# Patient Record
Sex: FEMALE | Race: BLACK OR AFRICAN AMERICAN | NOT HISPANIC OR LATINO
[De-identification: names, ages, dates, MRNs, and addresses within clinical notes are randomized per-mention and may not be internally consistent; named-entity substitution may affect disease eponyms.]

---

## 2018-03-26 ENCOUNTER — RESULT REVIEW (OUTPATIENT)
Age: 48
End: 2018-03-26

## 2020-09-29 ENCOUNTER — APPOINTMENT (OUTPATIENT)
Dept: NEUROLOGY | Facility: CLINIC | Age: 50
End: 2020-09-29
Payer: COMMERCIAL

## 2020-09-29 VITALS
BODY MASS INDEX: 38.37 KG/M2 | HEIGHT: 70 IN | WEIGHT: 268 LBS | SYSTOLIC BLOOD PRESSURE: 131 MMHG | DIASTOLIC BLOOD PRESSURE: 84 MMHG | HEART RATE: 83 BPM

## 2020-09-29 VITALS — TEMPERATURE: 97.1 F

## 2020-09-29 DIAGNOSIS — Z87.891 PERSONAL HISTORY OF NICOTINE DEPENDENCE: ICD-10-CM

## 2020-09-29 DIAGNOSIS — H50.9 UNSPECIFIED STRABISMUS: ICD-10-CM

## 2020-09-29 PROCEDURE — 99205 OFFICE O/P NEW HI 60 MIN: CPT

## 2020-10-05 ENCOUNTER — APPOINTMENT (OUTPATIENT)
Dept: NEUROLOGY | Facility: CLINIC | Age: 50
End: 2020-10-05

## 2020-10-05 RX ORDER — CLOTRIMAZOLE AND BETAMETHASONE DIPROPIONATE 10; .5 MG/G; MG/G
1-0.05 CREAM TOPICAL
Refills: 0 | Status: ACTIVE | COMMUNITY
Start: 2020-10-05

## 2020-10-05 RX ORDER — DULOXETINE HYDROCHLORIDE 30 MG/1
30 CAPSULE, DELAYED RELEASE PELLETS ORAL
Qty: 30 | Refills: 1 | Status: ACTIVE | COMMUNITY
Start: 2020-10-05

## 2020-10-05 RX ORDER — SIMVASTATIN 40 MG/1
40 TABLET, FILM COATED ORAL
Qty: 90 | Refills: 2 | Status: ACTIVE | COMMUNITY
Start: 2020-10-05

## 2020-10-05 RX ORDER — CYCLOBENZAPRINE HYDROCHLORIDE 10 MG/1
10 TABLET, FILM COATED ORAL TWICE DAILY
Refills: 0 | Status: ACTIVE | COMMUNITY
Start: 2020-10-05

## 2020-10-05 RX ORDER — DULOXETINE HYDROCHLORIDE 60 MG/1
60 CAPSULE, DELAYED RELEASE ORAL
Refills: 0 | Status: DISCONTINUED | COMMUNITY
End: 2020-10-05

## 2020-10-05 RX ORDER — SIMVASTATIN 80 MG/1
TABLET, FILM COATED ORAL
Refills: 0 | Status: DISCONTINUED | COMMUNITY
End: 2020-10-05

## 2020-10-05 RX ORDER — OMEGA-3/DHA/EPA/FISH OIL 60 MG-90MG
500 CAPSULE ORAL
Refills: 0 | Status: ACTIVE | COMMUNITY
Start: 2020-10-05

## 2020-10-05 RX ORDER — BENZALKONIUM CHLORIDE 170 MG/89ML
GEL TOPICAL
Refills: 0 | Status: ACTIVE | COMMUNITY
Start: 2020-10-05

## 2020-10-05 RX ORDER — ERGOCALCIFEROL 1.25 MG/1
1.25 MG CAPSULE, LIQUID FILLED ORAL
Refills: 0 | Status: ACTIVE | COMMUNITY
Start: 2020-10-05

## 2020-10-20 ENCOUNTER — APPOINTMENT (OUTPATIENT)
Dept: MRI IMAGING | Facility: HOSPITAL | Age: 50
End: 2020-10-20

## 2020-10-20 ENCOUNTER — OUTPATIENT (OUTPATIENT)
Dept: OUTPATIENT SERVICES | Facility: HOSPITAL | Age: 50
LOS: 1 days | End: 2020-10-20
Payer: COMMERCIAL

## 2020-10-20 ENCOUNTER — TRANSCRIPTION ENCOUNTER (OUTPATIENT)
Age: 50
End: 2020-10-20

## 2020-10-20 DIAGNOSIS — Z00.00 ENCOUNTER FOR GENERAL ADULT MEDICAL EXAMINATION WITHOUT ABNORMAL FINDINGS: ICD-10-CM

## 2020-10-20 DIAGNOSIS — I66.01 OCCLUSION AND STENOSIS OF RIGHT MIDDLE CEREBRAL ARTERY: ICD-10-CM

## 2020-10-20 PROCEDURE — 70549 MR ANGIOGRAPH NECK W/O&W/DYE: CPT

## 2020-10-20 PROCEDURE — 70551 MRI BRAIN STEM W/O DYE: CPT | Mod: 26

## 2020-10-20 PROCEDURE — 70544 MR ANGIOGRAPHY HEAD W/O DYE: CPT | Mod: 26,59

## 2020-10-20 PROCEDURE — 70551 MRI BRAIN STEM W/O DYE: CPT

## 2020-10-20 PROCEDURE — 70544 MR ANGIOGRAPHY HEAD W/O DYE: CPT

## 2020-10-20 PROCEDURE — A9585: CPT

## 2020-10-20 PROCEDURE — 70549 MR ANGIOGRAPH NECK W/O&W/DYE: CPT | Mod: 26

## 2020-10-26 ENCOUNTER — TRANSCRIPTION ENCOUNTER (OUTPATIENT)
Age: 50
End: 2020-10-26

## 2020-12-18 ENCOUNTER — APPOINTMENT (OUTPATIENT)
Dept: NEUROLOGY | Facility: CLINIC | Age: 50
End: 2020-12-18
Payer: COMMERCIAL

## 2020-12-18 VITALS
WEIGHT: 267 LBS | DIASTOLIC BLOOD PRESSURE: 98 MMHG | OXYGEN SATURATION: 98 % | BODY MASS INDEX: 38.22 KG/M2 | HEIGHT: 70 IN | SYSTOLIC BLOOD PRESSURE: 141 MMHG | HEART RATE: 88 BPM

## 2020-12-18 DIAGNOSIS — Z98.890 OTHER SPECIFIED POSTPROCEDURAL STATES: ICD-10-CM

## 2020-12-18 PROCEDURE — 99072 ADDL SUPL MATRL&STAF TM PHE: CPT

## 2020-12-18 PROCEDURE — 99215 OFFICE O/P EST HI 40 MIN: CPT

## 2020-12-18 NOTE — PHYSICAL EXAM
[General Appearance - Alert] : alert [General Appearance - In No Acute Distress] : in no acute distress [Oriented To Time, Place, And Person] : oriented to person, place, and time [Impaired Insight] : insight and judgment were intact [Affect] : the affect was normal [Person] : oriented to person [Place] : oriented to place [Time] : oriented to time [Concentration Intact] : normal concentrating ability [Visual Intact] : visual attention was ~T not ~L decreased [Naming Objects] : no difficulty naming common objects [Repeating Phrases] : no difficulty repeating a phrase [Reading] : reading intact [Cranial Nerves Trigeminal (V)] : facial sensation intact symmetrically [Cranial Nerves Facial (VII)] : face symmetrical [Cranial Nerves Vestibulocochlear (VIII)] : hearing was intact bilaterally [Cranial Nerves Glossopharyngeal (IX)] : tongue and palate midline [Cranial Nerves Accessory (XI - Cranial And Spinal)] : head turning and shoulder shrug symmetric [Cranial Nerves Hypoglossal (XII)] : there was no tongue deviation with protrusion [Motor Tone] : muscle tone was normal in all four extremities [Motor Strength] : muscle strength was normal in all four extremities [No Muscle Atrophy] : normal bulk in all four extremities [Motor Handedness Right-Handed] : the patient is right hand dominant [Sensation Tactile Decrease] : light touch was intact [Balance] : balance was intact [Sclera] : the sclera and conjunctiva were normal [Outer Ear] : the ears and nose were normal in appearance [] : no respiratory distress [Heart Rate And Rhythm] : heart rate was normal and rhythm regular [Edema] : there was no peripheral edema [Bowel Sounds] : normal bowel sounds [Abnormal Walk] : normal gait [Skin Color & Pigmentation] : normal skin color and pigmentation [Paresis Pronator Drift Right-Sided] : no pronator drift on the right [Paresis Pronator Drift Left-Sided] : no pronator drift on the left [Motor Strength Upper Extremities Bilaterally] : strength was normal in both upper extremities [Motor Strength Lower Extremities Bilaterally] : strength was normal in both lower extremities [Romberg's Sign] : Romberg's sign was negtive [Past-pointing] : there was no past-pointing [Tremor] : no tremor present [Dysdiadochokinesia Bilaterally] : not present [Coordination - Dysmetria Impaired Finger-to-Nose Bilateral] : not present [Coordination - Dysmetria Impaired Heel-to-Shin Bilateral] : not present [Plantar Reflex Right Only] : normal on the right [Plantar Reflex Left Only] : normal on the left [FreeTextEntry5] : L Strabismus, R keratoconus, ? right sided horizontal nystagmus on left lateral gaze  [FreeTextEntry6] : limited movement in L shoulder due to surgery

## 2020-12-18 NOTE — DISCUSSION/SUMMARY
[Antithrombotic therapy with ___] : antithrombotic therapy with  [unfilled] [Lipid Lowering Therapy] : lipid lowering therapy [Patient encouraged to discuss with Primary MD] : I encouraged the patient to discuss these important issues with ~his/her~ primary care doctor [Goals and Counseling] : I have reviewed the goals of stroke risk factor modification. I counseled the patient on measures to reduce stroke risk, including the importance of medication compliance, risk factor control, exercise, healthy diet and avoidance of smoking. I reviewed stroke warning signs and symptoms and appropriate actions to take if such occur. [FreeTextEntry1] : Impression: Asymptomatic R MCA stenosis. Likely not vasculitis or RVCS, \par \par Exam pertient for L Strabismus, right sided horizontal nystagmus on left lateral gaze, otherwise non-focal\par \par Imaging reviewed\par \par Plan:\par -c/w to monitor, asymptomatic at this time, MRI negative for any previous infarcts \par -c/w ASA 81mg daily \par -Most recent LDL is 285, patient is on statin at home, advised for lifestyle changes \par -A1c: 5.9, advised for lifestyle changes. \par -Discussed signs of stroke, TIA with patient and advised to call 911 if experiencing \par -Recommending imaging prior to Strabismus to assess for degree to stenosis. Dr. Gaines (cell # [phone]) advised and agrees with plan.\par -recommend TCDs, carotid dopplers  \par -follow up in 6 months for follow up

## 2020-12-18 NOTE — HISTORY OF PRESENT ILLNESS
[FreeTextEntry1] : Ms. Gloria is a 49 yo woman who presents to vascular neurology clinic for follow up regarding MRA finding of asymptomatic right proximal M1 stenosis, incidentally found during workup performed by Dr. Gaines, one of her outpatient physicians, regarding chronic left strabismus, as well as chronic headaches. MRI non-contrast unremarkable for any infarction, MRA NOVA does reveal markedly decreased flow through the M1 segment on the right, but with good flow both proximally and distally to the filling defect. The rest of vessels open. Patient remains asymptomatic with no focal deficits localized to the right MCA territory. Patient states she is a little overwhelmed with many social issues she's been having recently. She was sincerely apologetic for arriving late to her appointment. \par _________________________\par \par Ms. Gloria 50 year old PMHx HLD, anemic s/p blood and iron transfusion, L Strabismus surgery (2000)  who presents today for consultation after abnormal MRA \par \par As a infant she was wore a patch on the L eye with minimal improvement. In 2000 she had L Strabismus surgery but she feels over the years it has gotten worse - the eye is now upwards and outwards. In April is when her vision declined, and she developed double vision and her left eye started to tear. She was found to have a R keratoconus and sent to Dr. Gaines to discuss Strabismus surgery. In August she had L sided rotator cuff surgery and now have intermittent numbness in her fingers on the left side. In general she has infrequent headaches (approx 1 per month) on the right side of her head lasting less then 5 minutes. She state the pain is intense and "stops in her tracks". During this time she had BL hand numbness but denies any new or worsening neurological signs or symptoms. She denies any hx of DVT, PE and had 1 miscarriage <10 weeks\par \par Dr. Gaines ordered brain imaging to assess for acute pathology prior to L Strabismus surgery and MRI was unremarkable but MRA revealed R MCA stenosis. Report reads R MCA arises more inferiorly from the right carotid terminus. Moderate narrowing at the origin of the R MCA. Focal high grade narrowing with 1.5 mm segment of suggested flow gap at the proximal aspect of the larger more superior R MCA. MIld narrowing in the distal p2 segment of the right PCA.

## 2021-01-06 ENCOUNTER — NON-APPOINTMENT (OUTPATIENT)
Age: 51
End: 2021-01-06

## 2021-04-21 ENCOUNTER — APPOINTMENT (OUTPATIENT)
Dept: NEUROLOGY | Facility: CLINIC | Age: 51
End: 2021-04-21
Payer: COMMERCIAL

## 2021-04-21 VITALS — TEMPERATURE: 97.6 F

## 2021-04-21 VITALS — TEMPERATURE: 97.5 F

## 2021-04-21 VITALS — BODY MASS INDEX: 39.37 KG/M2 | HEIGHT: 70 IN | WEIGHT: 275 LBS

## 2021-04-21 PROCEDURE — 99213 OFFICE O/P EST LOW 20 MIN: CPT

## 2021-04-21 PROCEDURE — 99072 ADDL SUPL MATRL&STAF TM PHE: CPT

## 2021-05-14 ENCOUNTER — OUTPATIENT (OUTPATIENT)
Dept: OUTPATIENT SERVICES | Facility: HOSPITAL | Age: 51
LOS: 1 days | End: 2021-05-14
Payer: COMMERCIAL

## 2021-05-14 ENCOUNTER — APPOINTMENT (OUTPATIENT)
Dept: MRI IMAGING | Facility: CLINIC | Age: 51
End: 2021-05-14
Payer: COMMERCIAL

## 2021-05-14 DIAGNOSIS — I66.01 OCCLUSION AND STENOSIS OF RIGHT MIDDLE CEREBRAL ARTERY: ICD-10-CM

## 2021-05-14 DIAGNOSIS — Z00.8 ENCOUNTER FOR OTHER GENERAL EXAMINATION: ICD-10-CM

## 2021-05-14 PROCEDURE — 70551 MRI BRAIN STEM W/O DYE: CPT

## 2021-05-14 PROCEDURE — 70551 MRI BRAIN STEM W/O DYE: CPT | Mod: 26

## 2021-05-17 ENCOUNTER — NON-APPOINTMENT (OUTPATIENT)
Age: 51
End: 2021-05-17

## 2021-05-18 ENCOUNTER — NON-APPOINTMENT (OUTPATIENT)
Age: 51
End: 2021-05-18

## 2021-06-21 ENCOUNTER — APPOINTMENT (OUTPATIENT)
Dept: NEUROLOGY | Facility: CLINIC | Age: 51
End: 2021-06-21
Payer: COMMERCIAL

## 2021-06-21 VITALS
HEART RATE: 45 BPM | HEIGHT: 70 IN | SYSTOLIC BLOOD PRESSURE: 128 MMHG | DIASTOLIC BLOOD PRESSURE: 82 MMHG | BODY MASS INDEX: 38.22 KG/M2 | WEIGHT: 267 LBS

## 2021-06-21 PROCEDURE — 99072 ADDL SUPL MATRL&STAF TM PHE: CPT

## 2021-06-21 PROCEDURE — 93880 EXTRACRANIAL BILAT STUDY: CPT

## 2021-06-21 PROCEDURE — 99215 OFFICE O/P EST HI 40 MIN: CPT

## 2021-06-21 PROCEDURE — 93886 INTRACRANIAL COMPLETE STUDY: CPT

## 2021-06-21 PROCEDURE — 93892 TCD EMBOLI DETECT W/O INJ: CPT

## 2022-11-30 ENCOUNTER — EMERGENCY (EMERGENCY)
Facility: HOSPITAL | Age: 52
LOS: 1 days | Discharge: HOME | End: 2022-11-30
Attending: EMERGENCY MEDICINE
Payer: COMMERCIAL

## 2022-11-30 VITALS
DIASTOLIC BLOOD PRESSURE: 83 MMHG | HEIGHT: 71 IN | RESPIRATION RATE: 20 BRPM | SYSTOLIC BLOOD PRESSURE: 133 MMHG | TEMPERATURE: 101 F | OXYGEN SATURATION: 96 % | WEIGHT: 235.01 LBS | HEART RATE: 123 BPM

## 2022-11-30 VITALS
TEMPERATURE: 99 F | HEART RATE: 68 BPM | SYSTOLIC BLOOD PRESSURE: 112 MMHG | OXYGEN SATURATION: 98 % | DIASTOLIC BLOOD PRESSURE: 64 MMHG | RESPIRATION RATE: 17 BRPM

## 2022-11-30 LAB
ALBUMIN SERPL ELPH-MCNC: 4.1 G/DL — SIGNIFICANT CHANGE UP (ref 3.3–5)
ALP SERPL-CCNC: 88 U/L — SIGNIFICANT CHANGE UP (ref 40–120)
ALT FLD-CCNC: 13 U/L — SIGNIFICANT CHANGE UP (ref 10–45)
ANION GAP SERPL CALC-SCNC: 9 MMOL/L — SIGNIFICANT CHANGE UP (ref 5–17)
AST SERPL-CCNC: 33 U/L — SIGNIFICANT CHANGE UP (ref 10–40)
BASOPHILS # BLD AUTO: 0.02 K/UL — SIGNIFICANT CHANGE UP (ref 0–0.2)
BASOPHILS NFR BLD AUTO: 0.2 % — SIGNIFICANT CHANGE UP (ref 0–2)
BILIRUB SERPL-MCNC: 0.6 MG/DL — SIGNIFICANT CHANGE UP (ref 0.2–1.2)
BUN SERPL-MCNC: 7 MG/DL — SIGNIFICANT CHANGE UP (ref 7–23)
CALCIUM SERPL-MCNC: 9.5 MG/DL — SIGNIFICANT CHANGE UP (ref 8.4–10.5)
CHLORIDE SERPL-SCNC: 104 MMOL/L — SIGNIFICANT CHANGE UP (ref 96–108)
CO2 SERPL-SCNC: 22 MMOL/L — SIGNIFICANT CHANGE UP (ref 22–31)
CREAT SERPL-MCNC: 0.64 MG/DL — SIGNIFICANT CHANGE UP (ref 0.5–1.3)
EGFR: 108 ML/MIN/1.73M2 — SIGNIFICANT CHANGE UP
EOSINOPHIL # BLD AUTO: 0 K/UL — SIGNIFICANT CHANGE UP (ref 0–0.5)
EOSINOPHIL NFR BLD AUTO: 0 % — SIGNIFICANT CHANGE UP (ref 0–6)
FLUAV AG NPH QL: SIGNIFICANT CHANGE UP
FLUBV AG NPH QL: SIGNIFICANT CHANGE UP
GLUCOSE SERPL-MCNC: 99 MG/DL — SIGNIFICANT CHANGE UP (ref 70–99)
HCT VFR BLD CALC: 40.9 % — SIGNIFICANT CHANGE UP (ref 34.5–45)
HGB BLD-MCNC: 13.6 G/DL — SIGNIFICANT CHANGE UP (ref 11.5–15.5)
IMM GRANULOCYTES NFR BLD AUTO: 0.5 % — SIGNIFICANT CHANGE UP (ref 0–0.9)
LYMPHOCYTES # BLD AUTO: 1.82 K/UL — SIGNIFICANT CHANGE UP (ref 1–3.3)
LYMPHOCYTES # BLD AUTO: 19.9 % — SIGNIFICANT CHANGE UP (ref 13–44)
MAGNESIUM SERPL-MCNC: 1.8 MG/DL — SIGNIFICANT CHANGE UP (ref 1.6–2.6)
MCHC RBC-ENTMCNC: 28.2 PG — SIGNIFICANT CHANGE UP (ref 27–34)
MCHC RBC-ENTMCNC: 33.3 GM/DL — SIGNIFICANT CHANGE UP (ref 32–36)
MCV RBC AUTO: 84.9 FL — SIGNIFICANT CHANGE UP (ref 80–100)
MONOCYTES # BLD AUTO: 0.61 K/UL — SIGNIFICANT CHANGE UP (ref 0–0.9)
MONOCYTES NFR BLD AUTO: 6.7 % — SIGNIFICANT CHANGE UP (ref 2–14)
NEUTROPHILS # BLD AUTO: 6.64 K/UL — SIGNIFICANT CHANGE UP (ref 1.8–7.4)
NEUTROPHILS NFR BLD AUTO: 72.7 % — SIGNIFICANT CHANGE UP (ref 43–77)
NRBC # BLD: 0 /100 WBCS — SIGNIFICANT CHANGE UP (ref 0–0)
PLATELET # BLD AUTO: 226 K/UL — SIGNIFICANT CHANGE UP (ref 150–400)
POTASSIUM SERPL-MCNC: 3.6 MMOL/L — SIGNIFICANT CHANGE UP (ref 3.5–5.3)
POTASSIUM SERPL-MCNC: 5.8 MMOL/L — HIGH (ref 3.5–5.3)
POTASSIUM SERPL-SCNC: 3.6 MMOL/L — SIGNIFICANT CHANGE UP (ref 3.5–5.3)
POTASSIUM SERPL-SCNC: 5.8 MMOL/L — HIGH (ref 3.5–5.3)
PROT SERPL-MCNC: 7.5 G/DL — SIGNIFICANT CHANGE UP (ref 6–8.3)
RBC # BLD: 4.82 M/UL — SIGNIFICANT CHANGE UP (ref 3.8–5.2)
RBC # FLD: 14.4 % — SIGNIFICANT CHANGE UP (ref 10.3–14.5)
RSV RNA NPH QL NAA+NON-PROBE: SIGNIFICANT CHANGE UP
SARS-COV-2 RNA SPEC QL NAA+PROBE: SIGNIFICANT CHANGE UP
SODIUM SERPL-SCNC: 135 MMOL/L — SIGNIFICANT CHANGE UP (ref 135–145)
WBC # BLD: 9.14 K/UL — SIGNIFICANT CHANGE UP (ref 3.8–10.5)
WBC # FLD AUTO: 9.14 K/UL — SIGNIFICANT CHANGE UP (ref 3.8–10.5)

## 2022-11-30 PROCEDURE — 84484 ASSAY OF TROPONIN QUANT: CPT

## 2022-11-30 PROCEDURE — 83690 ASSAY OF LIPASE: CPT

## 2022-11-30 PROCEDURE — 71046 X-RAY EXAM CHEST 2 VIEWS: CPT

## 2022-11-30 PROCEDURE — 82330 ASSAY OF CALCIUM: CPT

## 2022-11-30 PROCEDURE — 80053 COMPREHEN METABOLIC PANEL: CPT

## 2022-11-30 PROCEDURE — 96374 THER/PROPH/DIAG INJ IV PUSH: CPT

## 2022-11-30 PROCEDURE — 84295 ASSAY OF SERUM SODIUM: CPT

## 2022-11-30 PROCEDURE — 85018 HEMOGLOBIN: CPT

## 2022-11-30 PROCEDURE — 82435 ASSAY OF BLOOD CHLORIDE: CPT

## 2022-11-30 PROCEDURE — 36415 COLL VENOUS BLD VENIPUNCTURE: CPT

## 2022-11-30 PROCEDURE — 83605 ASSAY OF LACTIC ACID: CPT

## 2022-11-30 PROCEDURE — 99285 EMERGENCY DEPT VISIT HI MDM: CPT | Mod: 25

## 2022-11-30 PROCEDURE — 84132 ASSAY OF SERUM POTASSIUM: CPT

## 2022-11-30 PROCEDURE — 82803 BLOOD GASES ANY COMBINATION: CPT

## 2022-11-30 PROCEDURE — 85025 COMPLETE CBC W/AUTO DIFF WBC: CPT

## 2022-11-30 PROCEDURE — 99285 EMERGENCY DEPT VISIT HI MDM: CPT

## 2022-11-30 PROCEDURE — 87637 SARSCOV2&INF A&B&RSV AMP PRB: CPT

## 2022-11-30 PROCEDURE — 71046 X-RAY EXAM CHEST 2 VIEWS: CPT | Mod: 26

## 2022-11-30 PROCEDURE — 82947 ASSAY GLUCOSE BLOOD QUANT: CPT

## 2022-11-30 PROCEDURE — 85014 HEMATOCRIT: CPT

## 2022-11-30 PROCEDURE — 93005 ELECTROCARDIOGRAM TRACING: CPT

## 2022-11-30 PROCEDURE — 83735 ASSAY OF MAGNESIUM: CPT

## 2022-11-30 RX ORDER — ONDANSETRON 8 MG/1
4 TABLET, FILM COATED ORAL ONCE
Refills: 0 | Status: COMPLETED | OUTPATIENT
Start: 2022-11-30 | End: 2022-11-30

## 2022-11-30 RX ORDER — SODIUM CHLORIDE 9 MG/ML
1000 INJECTION, SOLUTION INTRAVENOUS ONCE
Refills: 0 | Status: COMPLETED | OUTPATIENT
Start: 2022-11-30 | End: 2022-11-30

## 2022-11-30 RX ORDER — ACETAMINOPHEN 500 MG
975 TABLET ORAL ONCE
Refills: 0 | Status: COMPLETED | OUTPATIENT
Start: 2022-11-30 | End: 2022-11-30

## 2022-11-30 RX ADMIN — Medication 975 MILLIGRAM(S): at 09:13

## 2022-11-30 RX ADMIN — SODIUM CHLORIDE 1000 MILLILITER(S): 9 INJECTION, SOLUTION INTRAVENOUS at 09:12

## 2022-11-30 RX ADMIN — ONDANSETRON 4 MILLIGRAM(S): 8 TABLET, FILM COATED ORAL at 09:12

## 2022-11-30 NOTE — ED PROVIDER NOTE - NSFOLLOWUPINSTRUCTIONS_ED_ALL_ED_FT
1.  Stay well hydrated  2.  Take Tylenol 1000mgs every 6 hrs as needed for fevers/body aches  3.  Follow up with your PMD in 2-3 days.  Bring a copy of your results with you to your appointment  4.  Return to the ER for shortness of breath, abdominal pain, persistent vomiting or any other concerning symptoms

## 2022-11-30 NOTE — ED PROVIDER NOTE - PROGRESS NOTE DETAILS
Patient seen at bedside in NAD.  VSS.  Patient resting comfortably.  Patient reports that she is feeling much better s/p IV fluids and Tylenol. Tolerating PO. Labs unremarkable.  COVID, flu, RSV negative.  Symptoms likely secondary to other viral URI.  Will DC home with close outpatient follow-up, education on supportive care and strict return precautions. -Braydon León PA-C pt feeling better. on repeat exam abd soft and nontender. pt denies any abdominal pain. able to tolerate po. no neck pain or rigidity. flu/covid swab negative. symptoms suggestive of viral process with boyfriend sick with siimlar. no rash on exam. less likely bacteremia, no WBC> pt tolerating po and well appearing. pt instructed to return for any abdominal pain, neck pain, rash or worsening symptoms.

## 2022-11-30 NOTE — ED ADULT NURSE NOTE - NSIMPLEMENTINTERV_GEN_ALL_ED
Implemented All Universal Safety Interventions:  [place] to call system. Call bell, personal items and telephone within reach. Instruct patient to call for assistance. Room bathroom lighting operational. Non-slip footwear when patient is off stretcher. Physically safe environment: no spills, clutter or unnecessary equipment. Stretcher in lowest position, wheels locked, appropriate side rails in place.

## 2022-11-30 NOTE — ED PROVIDER NOTE - CLINICAL SUMMARY MEDICAL DECISION MAKING FREE TEXT BOX
Attending Natasha Vega: 53 yo female h/o strabismus in the past, gastric surgery, fibromyalgia. presenting with fevers, nausea, headache and not feeling well. mom tested positive for COVID. boyfriend is sick with similar symptoms. upon arrival pt tachycardic, febrile. on exam abd soft and nontender. suspect symptoms likely from viral infection, in the setting of known covid exposure. less likely acute surigcal process. pt is passing gas and abdomen soft on exam however in the setting of prior gastric surgery will obtain labs, hydrate, antipyretic and serial abdominal exams. no neck pain or rigidity to suggest meningitis. no falls or trauma and neuro exam accept for strabismus, which is chronic, is unremarkable

## 2022-11-30 NOTE — ED PROVIDER NOTE - NS ED ATTENDING STATEMENT MOD
I have seen and examined this patient and fully participated in the care of this patient as the teaching attending.  The service was shared with the [facility].  I reviewed and verified the documentation and independently performed the documented:

## 2022-11-30 NOTE — ED PROVIDER NOTE - PHYSICAL EXAMINATION
Gen: AAO x 3, NAD  Skin: No rashes or lesions  HEENT: NC/AT, PERRLA, EOMI, MMM  Resp: unlabored CTAB  Cardiac: rrr s1s2, no murmurs, rubs or gallops  GI: ND, +BS, Soft, NT  Ext: no pedal edema, FROM in all extremities  Neuro: no focal deficits, no neck stiffness or meningismus Gen: AAO x 3, NAD  Skin: No rashes or lesions  HEENT: NC/AT, PERRLA, +strabismus (chronic), MMM  Resp: unlabored CTAB  Cardiac: rrr s1s2, no murmurs, rubs or gallops  GI: ND, +BS, Soft, NT  Ext: no pedal edema, FROM in all extremities  Neuro: no focal deficits, no neck stiffness or meningismus Gen: AAO x 3, NAD  Skin: No rashes or lesions  HEENT: NC/AT, PERRLA, +strabismus (chronic), MMM  Resp: unlabored CTAB  Cardiac: rrr s1s2, no murmurs, rubs or gallops  GI: ND, +BS, Soft, NT  Ext: no pedal edema, FROM in all extremities  Neuro: no focal deficits, no neck stiffness or meningismus  Attending Natasha Vega: Gen: NAD, heent: atrauamtic, right eye ptosis, strabismus present bl mmm, op pink, uvula midline, no exudate, handling secretions, no drooling. neck; nttp, no nuchal rigidity, chest: nttp, no crepitus, cv: rrr, no murmurs, lungs: ctab, abd: soft, nontender, nondistended, no peritoneal signs,  no guarding, ext: wwp, neg homans, skin: no rash, neuro: awake and alert, following commands, speech clear, sensation and strength intact, no focal deficits

## 2022-11-30 NOTE — ED PROVIDER NOTE - ATTENDING CONTRIBUTION TO CARE
Attending MD Natasha Vega:   I personally have seen and examined this patient.  Physician assistant note reviewed and agree on plan of care and except where noted.  See HPI, PE, and MDM for details.

## 2022-11-30 NOTE — ED PROVIDER NOTE - PATIENT PORTAL LINK FT
You can access the FollowMyHealth Patient Portal offered by [facility] by registering at the following website: [email or web address]. By joining [facility]’s FollowMyHealth portal, you will also be able to view your health information using other applications (apps) compatible with our system.

## 2022-11-30 NOTE — ED ADULT NURSE NOTE - OBJECTIVE STATEMENT
53 yo AOx4 from home with PMH of HTN, fibromyalgia, and prediabetes and PSH of gastric sleeve, rotator cuff and bursitis c/o fever, chills, nausea, fever, diarrhea, and headache. Pt states she has been taking care of mother for COVID at the hospital since Sunday. And woke up today with fevers and chills. Pt states negative COVID test at home. Pt confirms boyfriend is at the ED with the same symptoms. Pt denies c/p, sob, abdominal pain, dysuria, blood in urine or stool.

## 2022-11-30 NOTE — ED PROVIDER NOTE - OBJECTIVE STATEMENT
52-year-old female with past medical history of HTN, HLD, fibromyalgia, anxiety, s/p gastric sleeve presenting with fevers.  Patient reports that she developed fevers and chills last night.  Woke up this morning with persistent chills, headache, nausea, nasal congestion and mild cough.  Patient states that her mother tested positive for COVID a few days ago and is currently admitted to the hospital.  Patient has tested herself with home COVID test 3 times and that has been negative each time.  Patient denies chest pain, shortness of breath, abdominal pain, vomiting, dysuria, frequency, but endorses 2-3 episodes of diarrhea over the last 3 days.  Patient's boyfriend currently in the ER with similar symptoms.

## 2022-12-03 ENCOUNTER — EMERGENCY (EMERGENCY)
Facility: HOSPITAL | Age: 52
LOS: 1 days | Discharge: HOME | End: 2022-12-03
Attending: EMERGENCY MEDICINE
Payer: COMMERCIAL

## 2022-12-03 VITALS
OXYGEN SATURATION: 98 % | HEART RATE: 65 BPM | WEIGHT: 235.01 LBS | HEIGHT: 71 IN | SYSTOLIC BLOOD PRESSURE: 128 MMHG | TEMPERATURE: 98 F | RESPIRATION RATE: 18 BRPM | DIASTOLIC BLOOD PRESSURE: 81 MMHG

## 2022-12-03 PROCEDURE — 99284 EMERGENCY DEPT VISIT MOD MDM: CPT

## 2022-12-03 NOTE — ED ADULT TRIAGE NOTE - CHIEF COMPLAINT QUOTE
Pt c/o neck pain since yesterday. Taking pain medication and muscle relaxers without relief. Denies trauma

## 2022-12-04 VITALS
TEMPERATURE: 98 F | RESPIRATION RATE: 16 BRPM | DIASTOLIC BLOOD PRESSURE: 79 MMHG | HEART RATE: 62 BPM | SYSTOLIC BLOOD PRESSURE: 123 MMHG | OXYGEN SATURATION: 97 %

## 2022-12-04 PROCEDURE — 99284 EMERGENCY DEPT VISIT MOD MDM: CPT | Mod: 25

## 2022-12-04 PROCEDURE — 96375 TX/PRO/DX INJ NEW DRUG ADDON: CPT

## 2022-12-04 PROCEDURE — 96374 THER/PROPH/DIAG INJ IV PUSH: CPT

## 2022-12-04 RX ORDER — ACETAMINOPHEN 500 MG
1000 TABLET ORAL ONCE
Refills: 0 | Status: COMPLETED | OUTPATIENT
Start: 2022-12-04 | End: 2022-12-04

## 2022-12-04 RX ORDER — DIAZEPAM 5 MG
5 TABLET ORAL ONCE
Refills: 0 | Status: DISCONTINUED | OUTPATIENT
Start: 2022-12-04 | End: 2022-12-04

## 2022-12-04 RX ORDER — KETOROLAC TROMETHAMINE 30 MG/ML
15 SYRINGE (ML) INJECTION ONCE
Refills: 0 | Status: DISCONTINUED | OUTPATIENT
Start: 2022-12-04 | End: 2022-12-04

## 2022-12-04 RX ORDER — OXYCODONE HYDROCHLORIDE 5 MG/1
1 TABLET ORAL
Qty: 6 | Refills: 0
Start: 2022-12-04 | End: 2022-12-05

## 2022-12-04 RX ORDER — LIDOCAINE 4 G/100G
1 CREAM TOPICAL ONCE
Refills: 0 | Status: COMPLETED | OUTPATIENT
Start: 2022-12-04 | End: 2022-12-04

## 2022-12-04 RX ADMIN — Medication 5 MILLIGRAM(S): at 02:32

## 2022-12-04 RX ADMIN — LIDOCAINE 1 PATCH: 4 CREAM TOPICAL at 02:34

## 2022-12-04 RX ADMIN — Medication 400 MILLIGRAM(S): at 02:34

## 2022-12-04 RX ADMIN — Medication 15 MILLIGRAM(S): at 02:40

## 2022-12-04 NOTE — ED PROVIDER NOTE - OBJECTIVE STATEMENT
Attending Natasha Vega: 51 yo female h/o gastric sleeve in the past, fibromyalgias, strabismus recently seen in the ed for fevers and not feeilng well presenting with right sided neck pain. pt states for last 24 hours has been having increased pain to right neck. no falls or trauma. has a h/o herniated disc in the past affecting left side of her neck. reports some tingling to bl hands which she has had in the past. tried taking oxycodone, flexeril. cyclobenzaprine without improvement. no rash. no fevers. no difficulty swallowing.

## 2022-12-04 NOTE — ED PROVIDER NOTE - PHYSICAL EXAMINATION
Attending Natasha Vega: Gen: NAD, heent: atrauamtic, right eye strabisumus mmm, op pink, uvula midline, neck;no midline spinal ttp, ttp right lateral cervical spine, , chest: nttp, no crepitus, cv: rrr, no murmurs, lungs: ctab, abd: soft, nontender, nondistended, no peritoneal signs, no guarding, ext: wwp, neg homans, skin: no rash, neuro: awake and alert, following commands, speech clear, strength intact, stable gait

## 2022-12-04 NOTE — ED PROVIDER NOTE - PATIENT PORTAL LINK FT
You can access the FollowMyHealth Patient Portal offered by [facility] by registering at the following website: [email or web address]. By joining [facility]’s FollowMyHealth portal, you will also be able to view your health information using other applications (apps) compatible with our system.

## 2022-12-04 NOTE — ED PROVIDER NOTE - NSFOLLOWUPINSTRUCTIONS_ED_ALL_ED_FT
Please return immediately to the emergency department if you have worsening neck pain, any fevers, any numbness or tingling, any difficulty breathing or further concerns  Please take tylenol as well as your muscle relaxers for pain  If you still have worsening pain you can take the oxycodone. Please do not drive while taking the oxycodone  Please take senna or bowel regiment while taking the oxycodone  Please follow up with the spine doctors for further management

## 2022-12-04 NOTE — ED ADULT NURSE NOTE - OBJECTIVE STATEMENT
Pt c/o R side neck pain that started yesterday, denies any trauma or falls. PMH of herniated disc but does not usually cause pain to R side. Pt AAOx4, VSS, resp even and unlabored, GCS 15, NAD noted at this time.

## 2022-12-04 NOTE — ED PROVIDER NOTE - CLINICAL SUMMARY MEDICAL DECISION MAKING FREE TEXT BOX
Attending Natasha Vega: 51 yo female with ho fibromyalgia, gastric sleeve, spinal stenosis, presenting with right lateral neck pain. nonfocal neuro exam. no midline spinal ttp. 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 pt given pain medication with improvement in pain. no neurologic deficits on exam. will give spine center follow up, continue analgesia. no heavy lifting. no rash on exam. no headaches less likely dissection

## 2022-12-04 NOTE — ED PROVIDER NOTE - PROGRESS NOTE DETAILS
Attending Natasha Vega: pt able to sleep on repeat evaluation pain still present but improved. nonfocal neurologic exam. no midline spinal ttp. 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 does have rx for muscle relaxers. will given  small amount of increased pain control. pt will need to follow up with pcp and spine center

## 2023-11-07 ENCOUNTER — APPOINTMENT (OUTPATIENT)
Dept: NEUROLOGY | Facility: CLINIC | Age: 53
End: 2023-11-07
Payer: COMMERCIAL

## 2023-11-07 VITALS — DIASTOLIC BLOOD PRESSURE: 97 MMHG | HEART RATE: 87 BPM | HEIGHT: 70 IN | SYSTOLIC BLOOD PRESSURE: 157 MMHG

## 2023-11-07 PROCEDURE — 99214 OFFICE O/P EST MOD 30 MIN: CPT

## 2024-04-15 ENCOUNTER — APPOINTMENT (OUTPATIENT)
Dept: NEUROLOGY | Facility: CLINIC | Age: 54
End: 2024-04-15
Payer: COMMERCIAL

## 2024-04-15 VITALS
SYSTOLIC BLOOD PRESSURE: 143 MMHG | DIASTOLIC BLOOD PRESSURE: 85 MMHG | BODY MASS INDEX: 31.5 KG/M2 | HEART RATE: 76 BPM | WEIGHT: 220 LBS | HEIGHT: 70 IN

## 2024-04-15 DIAGNOSIS — I66.01 OCCLUSION AND STENOSIS OF RIGHT MIDDLE CEREBRAL ARTERY: ICD-10-CM

## 2024-04-15 DIAGNOSIS — G89.29 HEADACHE, UNSPECIFIED: ICD-10-CM

## 2024-04-15 DIAGNOSIS — R51.9 HEADACHE, UNSPECIFIED: ICD-10-CM

## 2024-04-15 PROCEDURE — 99215 OFFICE O/P EST HI 40 MIN: CPT

## 2024-04-15 RX ORDER — MINOCYCLINE HYDROCHLORIDE 100 MG/1
100 CAPSULE ORAL TWICE DAILY
Refills: 0 | Status: ACTIVE | COMMUNITY

## 2024-04-15 RX ORDER — OMEPRAZOLE 40 MG/1
40 CAPSULE, DELAYED RELEASE ORAL TWICE DAILY
Refills: 0 | Status: ACTIVE | COMMUNITY

## 2024-04-15 RX ORDER — HYOSCYAMINE SULFATE 0.375 MG
0.38 TABLET, EXTENDED RELEASE 12 HR ORAL TWICE DAILY
Refills: 0 | Status: ACTIVE | COMMUNITY

## 2024-04-15 RX ORDER — SUCRALFATE 1 G/1
1 TABLET ORAL 3 TIMES DAILY
Refills: 0 | Status: ACTIVE | COMMUNITY

## 2024-04-15 RX ORDER — MINOXIDIL 2.5 MG/1
2.5 TABLET ORAL TWICE DAILY
Refills: 0 | Status: ACTIVE | COMMUNITY

## 2024-04-15 RX ORDER — PREGABALIN 25 MG/1
25 CAPSULE ORAL DAILY
Refills: 0 | Status: ACTIVE | COMMUNITY

## 2024-04-15 RX ORDER — LOSARTAN POTASSIUM 100 MG/1
100 TABLET, FILM COATED ORAL DAILY
Refills: 0 | Status: ACTIVE | COMMUNITY

## 2024-04-15 RX ORDER — METRONIDAZOLE 10 MG/G
1 GEL TOPICAL
Refills: 0 | Status: ACTIVE | COMMUNITY

## 2024-04-15 NOTE — HISTORY OF PRESENT ILLNESS
[FreeTextEntry1] : Ms. Gloria 53 year old PMHx HLD, anemic s/p blood and iron transfusion, L Strabismus surgery (2000)  who presents today for after abnormal MRA .   As a infant she was wore a patch on the L eye with minimal improvement. In 2000 she had L Strabismus surgery but she feels over the years it has gotten worse - the eye is now upwards and outwards. In April is when her vision declined, and she developed double vision and her left eye started to tear. She was found to have a R keratoconus and sent to Dr. Gaines to discuss Strabismus surgery. In August she had L sided rotator cuff surgery and now have intermittent numbness in her fingers on the left side. In general she has infrequent headaches (approx 1 per month) on the right side of her head lasting less then 5 minutes. She state the pain is intense and "stops in her tracks". During this time she had BL hand numbness but denies any new or worsening neurological signs or symptoms. She denies any hx of DVT, PE and had 1 miscarriage <10 weeks  Dr. Gaines ordered brain imaging to assess for acute pathology prior to L Strabismus surgery and MRI was unremarkable but MRA revealed R MCA stenosis. Report reads R MCA arises more inferiorly from the right carotid terminus. Moderate narrowing at the origin of the R MCA. Focal high grade narrowing with 1.5 mm segment of suggested flow gap at the proximal aspect of the larger more superior R MCA. MIld narrowing in the distal p2 segment of the right PCA.   12/2020 Ms. Gloria is a 54 yo woman who presents to vascular neurology clinic for follow up regarding MRA finding of asymptomatic right proximal M1 stenosis, incidentally found during workup performed by Dr. Gaines, one of her outpatient physicians, regarding chronic left strabismus, as well as chronic headaches. MRI non-contrast unremarkable for any infarction, MRA NOVA does reveal markedly decreased flow through the M1 segment on the right, but with good flow both proximally and distally to the filling defect. The rest of vessels open. Patient remains asymptomatic with no focal deficits localized to the right MCA territory. Patient states she is a little overwhelmed with many social issues she's been having recently. She was sincerely apologetic for arriving late to her appointment.   4/21/21 Overall she reports feeling well but remains with a R frontal headache for about 2 weeks. She is s/p L strabismus surgery and remains with light sensitivity. She denies any new or worsening neurological symptoms.   11/7/2023 She is getting a PPM in 12/2023 due to bradycardia. She is still seen by pulm and still using CPAP. She is under lots of stress with mother, aunt and boyfriend. She stopped taking ASA for months for unknown reasons. She denies any new or worsening neurological events.   4/15/2024- s/p Pacemaker - EP visit - Dr.Seth Nieto; s/p pacemaker at [facility] for episode of syncope ; symptomatic bradycardia.

## 2024-04-15 NOTE — DISCUSSION/SUMMARY
[Antithrombotic therapy with ___] : antithrombotic therapy with  [unfilled] [Lipid Lowering Therapy] : lipid lowering therapy [Patient encouraged to discuss with Primary MD] : I encouraged the patient to discuss these important issues with ~his/her~ primary care doctor [Goals and Counseling] : I have reviewed the goals of stroke risk factor modification. I counseled the patient on measures to reduce stroke risk, including the importance of medication compliance, risk factor control, exercise, healthy diet and avoidance of smoking. I reviewed stroke warning signs and symptoms and appropriate actions to take if such occur. [FreeTextEntry1] : Impression: Asymptomatic R MCA stenosis. Likely not vasculitis or RVCS,  Exam pertinent for L Strabismus, right sided horizontal nystagmus on left lateral gaze, otherwise non-focal Imaging reviewed. MRI ordered to evaluate for acute pathology of persistent new headache - no infarct   She wants to go on social security due to her eye, stress and neck complaints.  I advised she follow up with a HA specialist.  4/15/2024 - s/p pacemaker for bradycardia, will arrange records from [facility] - cardiologist and EP - [facility] under stress related to family health condition    Plan: -c/w to monitor, asymptomatic at this time -c/w ASA 81mg daily   We do not have updated labs - advised her to send   -Most recent LDL is 285, patient is on statin at home, advised for lifestyle changes  -A1c: 5.9, advised for lifestyle changes.  -Discussed signs of stroke, TIA with patient and advised to call 911 if experiencing  -Recommending imaging prior to Strabismus to assess for degree to stenosis. Dr. Gaines (cell # [phone]) advised and agrees with plan. -recommend TCDs, carotid dopplers   -follow up in 6 months for follow up

## 2024-04-15 NOTE — PHYSICAL EXAM
[General Appearance - Alert] : alert [General Appearance - In No Acute Distress] : in no acute distress [Oriented To Time, Place, And Person] : oriented to person, place, and time [Impaired Insight] : insight and judgment were intact [Affect] : the affect was normal [Person] : oriented to person [Place] : oriented to place [Time] : oriented to time [Concentration Intact] : normal concentrating ability [Visual Intact] : visual attention was ~T not ~L decreased [Naming Objects] : no difficulty naming common objects [Repeating Phrases] : no difficulty repeating a phrase [Reading] : reading intact [Cranial Nerves Trigeminal (V)] : facial sensation intact symmetrically [Cranial Nerves Facial (VII)] : face symmetrical [Cranial Nerves Vestibulocochlear (VIII)] : hearing was intact bilaterally [Cranial Nerves Glossopharyngeal (IX)] : tongue and palate midline [Cranial Nerves Accessory (XI - Cranial And Spinal)] : head turning and shoulder shrug symmetric [Cranial Nerves Hypoglossal (XII)] : there was no tongue deviation with protrusion [Motor Tone] : muscle tone was normal in all four extremities [Motor Strength] : muscle strength was normal in all four extremities [No Muscle Atrophy] : normal bulk in all four extremities [Motor Handedness Right-Handed] : the patient is right hand dominant [Sensation Tactile Decrease] : light touch was intact [Abnormal Walk] : normal gait [Balance] : balance was intact [Sclera] : the sclera and conjunctiva were normal [Paresis Pronator Drift Right-Sided] : no pronator drift on the right [Motor Strength Upper Extremities Bilaterally] : strength was normal in both upper extremities [Paresis Pronator Drift Left-Sided] : no pronator drift on the left [Motor Strength Lower Extremities Bilaterally] : strength was normal in both lower extremities [Romberg's Sign] : Romberg's sign was negtive [Past-pointing] : there was no past-pointing [Tremor] : no tremor present [Dysdiadochokinesia Bilaterally] : not present [Coordination - Dysmetria Impaired Finger-to-Nose Bilateral] : not present [Coordination - Dysmetria Impaired Heel-to-Shin Bilateral] : not present [Plantar Reflex Right Only] : normal on the right [Plantar Reflex Left Only] : normal on the left [FreeTextEntry5] : L Strabismus, R keratoconus, ? right sided horizontal nystagmus on left lateral gaze (noted on previous exam) unable to assess today due to light sensitivity post eye surgery  [FreeTextEntry6] : limited movement in L shoulder due to surgery

## 2024-07-16 ENCOUNTER — APPOINTMENT (OUTPATIENT)
Dept: NEUROLOGY | Facility: CLINIC | Age: 54
End: 2024-07-16

## 2024-07-29 ENCOUNTER — APPOINTMENT (OUTPATIENT)
Dept: NEUROLOGY | Facility: CLINIC | Age: 54
End: 2024-07-29
Payer: COMMERCIAL

## 2024-07-29 VITALS
HEART RATE: 65 BPM | HEIGHT: 70 IN | BODY MASS INDEX: 35.65 KG/M2 | DIASTOLIC BLOOD PRESSURE: 88 MMHG | WEIGHT: 249 LBS | SYSTOLIC BLOOD PRESSURE: 147 MMHG

## 2024-07-29 VITALS
HEIGHT: 70 IN | SYSTOLIC BLOOD PRESSURE: 147 MMHG | DIASTOLIC BLOOD PRESSURE: 88 MMHG | BODY MASS INDEX: 35.65 KG/M2 | WEIGHT: 249 LBS

## 2024-07-29 DIAGNOSIS — I66.01 OCCLUSION AND STENOSIS OF RIGHT MIDDLE CEREBRAL ARTERY: ICD-10-CM

## 2024-07-29 PROCEDURE — G2211 COMPLEX E/M VISIT ADD ON: CPT

## 2024-07-29 PROCEDURE — 93880 EXTRACRANIAL BILAT STUDY: CPT

## 2024-07-29 PROCEDURE — 99214 OFFICE O/P EST MOD 30 MIN: CPT

## 2024-07-29 PROCEDURE — 93888 INTRACRANIAL LIMITED STUDY: CPT

## 2024-07-29 RX ORDER — ASPIRIN 81 MG
81 TABLET, DELAYED RELEASE (ENTERIC COATED) ORAL
Refills: 0 | Status: ACTIVE | COMMUNITY

## 2024-07-29 RX ORDER — ESCITALOPRAM OXALATE 10 MG/1
10 TABLET ORAL
Refills: 0 | Status: ACTIVE | COMMUNITY

## 2024-07-29 RX ORDER — FINASTERIDE 5 MG/1
5 TABLET, FILM COATED ORAL
Refills: 0 | Status: ACTIVE | COMMUNITY

## 2024-07-29 RX ORDER — ROSUVASTATIN CALCIUM 40 MG/1
40 TABLET, FILM COATED ORAL
Refills: 0 | Status: ACTIVE | COMMUNITY

## 2024-07-29 NOTE — PHYSICAL EXAM
[General Appearance - Alert] : alert [General Appearance - In No Acute Distress] : in no acute distress [Oriented To Time, Place, And Person] : oriented to person, place, and time [Impaired Insight] : insight and judgment were intact [Affect] : the affect was normal [Person] : oriented to person [Place] : oriented to place [Time] : oriented to time [Visual Intact] : visual attention was ~T not ~L decreased [Concentration Intact] : normal concentrating ability [Naming Objects] : no difficulty naming common objects [Repeating Phrases] : no difficulty repeating a phrase [Reading] : reading intact [Cranial Nerves Facial (VII)] : face symmetrical [Cranial Nerves Trigeminal (V)] : facial sensation intact symmetrically [Cranial Nerves Vestibulocochlear (VIII)] : hearing was intact bilaterally [Cranial Nerves Glossopharyngeal (IX)] : tongue and palate midline [Cranial Nerves Accessory (XI - Cranial And Spinal)] : head turning and shoulder shrug symmetric [Cranial Nerves Hypoglossal (XII)] : there was no tongue deviation with protrusion [Motor Tone] : muscle tone was normal in all four extremities [Motor Strength] : muscle strength was normal in all four extremities [No Muscle Atrophy] : normal bulk in all four extremities [Motor Handedness Right-Handed] : the patient is right hand dominant [Sensation Tactile Decrease] : light touch was intact [Abnormal Walk] : normal gait [Balance] : balance was intact [Sclera] : the sclera and conjunctiva were normal [Paresis Pronator Drift Right-Sided] : no pronator drift on the right [Paresis Pronator Drift Left-Sided] : no pronator drift on the left [Motor Strength Upper Extremities Bilaterally] : strength was normal in both upper extremities [Motor Strength Lower Extremities Bilaterally] : strength was normal in both lower extremities [Romberg's Sign] : Romberg's sign was negtive [Past-pointing] : there was no past-pointing [Tremor] : no tremor present [Dysdiadochokinesia Bilaterally] : not present [Coordination - Dysmetria Impaired Finger-to-Nose Bilateral] : not present [Coordination - Dysmetria Impaired Heel-to-Shin Bilateral] : not present [Plantar Reflex Right Only] : normal on the right [Plantar Reflex Left Only] : normal on the left [FreeTextEntry5] : L Strabismus, R keratoconus, ? right sided horizontal nystagmus on left lateral gaze (noted on previous exam) unable to assess today due to light sensitivity post eye surgery  [FreeTextEntry6] : limited movement in L shoulder due to surgery

## 2024-07-29 NOTE — DISCUSSION/SUMMARY
[Antithrombotic therapy with ___] : antithrombotic therapy with  [unfilled] [Lipid Lowering Therapy] : lipid lowering therapy [Patient encouraged to discuss with Primary MD] : I encouraged the patient to discuss these important issues with ~his/her~ primary care doctor [Goals and Counseling] : I have reviewed the goals of stroke risk factor modification. I counseled the patient on measures to reduce stroke risk, including the importance of medication compliance, risk factor control, exercise, healthy diet and avoidance of smoking. I reviewed stroke warning signs and symptoms and appropriate actions to take if such occur. [FreeTextEntry1] : Impression: Asymptomatic R MCA stenosis. Likely not vasculitis or RVCS,  Exam pertinent for L Strabismus, right sided horizontal nystagmus on left lateral gaze, otherwise non-focal Imaging reviewed. MRI ordered to evaluate for acute pathology of persistent new headache - no infarct   She wants to go on social security due to her eye, stress and neck complaints.  I advised she follow up with a HA specialist.  4/15/2024 - s/p pacemaker for bradycardia, will arrange records from [facility] - cardiologist and EP - [facility] under stress related to family health condition    Plan: -c/w to monitor, asymptomatic at this time -c/w ASA 81mg daily   We do not have updated labs - advised her to send   -Most recent LDL is 285, patient is on statin at home, advised for lifestyle changes  -A1c: 5.9, advised for lifestyle changes.  -Discussed signs of stroke, TIA with patient and advised to call 911 if experiencing  -Recommending imaging prior to Strabismus to assess for degree to stenosis. Dr. Gaines (cell # [phone]) advised and agrees with plan. -recommend TCDs, carotid dopplers   -follow up in 6 months for follow up   7/29/24 - Overall she is neurologically stable. - Dopplers done today were stable from prior exam and unremarkable. - She is taking Aspirin daily. - She should benefit from aggressive vascular risk factor control. In terms of lipids, target LDL should be less than 55. - We discussed stress reduction during the visit today. She is trying to get her mother accepted to a facility, which would ease her caregiving burden.  She can follow up in 6 months. I hope she remains free of serious trouble.

## 2024-07-29 NOTE — HISTORY OF PRESENT ILLNESS
[FreeTextEntry1] : Ms. Gloria 54 year old PMHx HLD, anemic s/p blood and iron transfusion, L Strabismus surgery (2000)  who presents today for after abnormal MRA .   As a infant she was wore a patch on the L eye with minimal improvement. In 2000 she had L Strabismus surgery but she feels over the years it has gotten worse - the eye is now upwards and outwards. In April is when her vision declined, and she developed double vision and her left eye started to tear. She was found to have a R keratoconus and sent to Dr. Gaines to discuss Strabismus surgery. In August she had L sided rotator cuff surgery and now have intermittent numbness in her fingers on the left side. In general she has infrequent headaches (approx 1 per month) on the right side of her head lasting less then 5 minutes. She state the pain is intense and "stops in her tracks". During this time she had BL hand numbness but denies any new or worsening neurological signs or symptoms. She denies any hx of DVT, PE and had 1 miscarriage <10 weeks  Dr. Gaines ordered brain imaging to assess for acute pathology prior to L Strabismus surgery and MRI was unremarkable but MRA revealed R MCA stenosis. Report reads R MCA arises more inferiorly from the right carotid terminus. Moderate narrowing at the origin of the R MCA. Focal high grade narrowing with 1.5 mm segment of suggested flow gap at the proximal aspect of the larger more superior R MCA. MIld narrowing in the distal p2 segment of the right PCA.   12/2020 Ms. Gloria is a 54 yo woman who presents to vascular neurology clinic for follow up regarding MRA finding of asymptomatic right proximal M1 stenosis, incidentally found during workup performed by Dr. Gaines, one of her outpatient physicians, regarding chronic left strabismus, as well as chronic headaches. MRI non-contrast unremarkable for any infarction, MRA NOVA does reveal markedly decreased flow through the M1 segment on the right, but with good flow both proximally and distally to the filling defect. The rest of vessels open. Patient remains asymptomatic with no focal deficits localized to the right MCA territory. Patient states she is a little overwhelmed with many social issues she's been having recently. She was sincerely apologetic for arriving late to her appointment.   4/21/21 Overall she reports feeling well but remains with a R frontal headache for about 2 weeks. She is s/p L strabismus surgery and remains with light sensitivity. She denies any new or worsening neurological symptoms.   11/7/2023 She is getting a PPM in 12/2023 due to bradycardia. She is still seen by pulm and still using CPAP. She is under lots of stress with mother, aunt and boyfriend. She stopped taking ASA for months for unknown reasons. She denies any new or worsening neurological events.   4/15/2024- s/p Pacemaker - EP visit - Dr.Seth Nieto; s/p pacemaker at [facility] for episode of syncope ; symptomatic bradycardia.  7/29/24 She presents to the office today. She has been under a lot of stress as she works full time and cares for her mother. She denies any new focal neurologic symptoms.  Carotid Doppler 7/29/24: Normal. Impression: No significant change from 06/2021. TCD 7/29/24: Normal ophthalmic arteries, carotid siphons and vertebrobasilar system. Impression: No significant change from 06/21, except unable to insonate temporal windows on current exam.

## 2024-12-23 NOTE — ED ADULT TRIAGE NOTE - NS ED TRIAGE AVPU SCALE
Patient  A&O x 4, respiration even and unlabored, reports to ED as per psychiatrist recommendation for evaluation. Patient with increased auditory hallucination as per mom and having difficulty sleeping.   No apparent distress noted. Safety maintained Alert-The patient is alert, awake and responds to voice. The patient is oriented to time, place, and person. The triage nurse is able to obtain subjective information.